# Patient Record
(demographics unavailable — no encounter records)

---

## 2025-02-08 NOTE — HISTORY OF PRESENT ILLNESS
[FreeTextEntry1] : Ms. SANGEETHA OGNZALEZ is a 56-year-old female who returns with regard to hx of type 2 dm. The diabetes has been present for about five years. She denies any history of retinopathy or nephropathy. With regard to neuropathy, she denies any neurol s/s.  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  For the diabetes, she is currently taking Mounjaro 10 mg weekly and has generally tolerated well.  Notes decrease in appetite.  - Was on Ozempic in the past, then switched to Mounjaro (supply shortage?).  Home glucose monitoring via Jad not available at this time as the device has been frequently falling off.   She does deny any significant hypoglycemic signs and symptoms of late.  Patient has seen weight loss on Ozempic/Mounjaro. She states she would not like to use any more weight; clothes not fitting.  Her current weight is ?? pounds, previously 157 pounds at the last visit in Aug 2024.   Recent A1C returned at 6.4% on labs 01/31/25. POCT glucose today at 101 mg/dL..  She denies polyuria, polydipsia, or any visual changes. She too denies any skin lesions, skin breakdown or non-healing areas of skin. She too denies any podiatric concerns. Ophthalmologic evaluation is up to date with Dr. Calles, no retinopathy noted.  ____________________________________________________________________________________________________ Patient does also have history of thyroid nodularity for which he is followed by Dr. Kearns. FNA March 2023 of dominant R nodule returned with results c/w with benign nodular goiter.   Latest Thyroid US from 08/2024 per Dr. Kearns showed a stable dominant right thyroid nodule.   Prior Thyroid US from 9/2023 per Dr. Kearns showed a stable dominant right thyroid nodule.   Denies anterior neck discomfort, difficulty swallowing, or any change in the appearance of the neck region|  Notes occasional nausea. too had leg cramps - Now on Magfnesium -better.   _____________________________________________________________________________________________++  Lab data from 01/31/2025 A1c 6.4 % LDL 58 Trig 51 TFT's qnl vitamin D 2-OH  25.3 ____________________________________________________________________________________________________  Additional Medications: Valsartan- HCTZ 320- 25 mg daily, Amlodipine 2.5 mg, Asa 81 mg, Atorvastatin 80 mg, Ezetimibe 10 mg Supplements: Vit D, CoQ-10

## 2025-02-08 NOTE — HISTORY OF PRESENT ILLNESS
[FreeTextEntry1] : Ms. SANGEETHA GONZALEZ is a 56-year-old female who returns with regard to hx of type 2 dm. The diabetes has been present for about five years. She denies any history of retinopathy or nephropathy. With regard to neuropathy, she denies any neurol s/s.  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  For the diabetes, she is currently taking Mounjaro 10 mg weekly and has generally tolerated well.  Notes decrease in appetite.  - Was on Ozempic in the past, then switched to Mounjaro (supply shortage?).  Home glucose monitoring via Jad not available at this time as the device has been frequently falling off.   She does deny any significant hypoglycemic signs and symptoms of late.  Patient has seen weight loss on Ozempic/Mounjaro. She states she would not like to use any more weight; clothes not fitting.  Her current weight is ?? pounds, previously 157 pounds at the last visit in Aug 2024.   Recent A1C returned at 6.4% on labs 01/31/25. POCT glucose today at 101 mg/dL..  She denies polyuria, polydipsia, or any visual changes. She too denies any skin lesions, skin breakdown or non-healing areas of skin. She too denies any podiatric concerns. Ophthalmologic evaluation is up to date with Dr. Calles, no retinopathy noted.  ____________________________________________________________________________________________________ Patient does also have history of thyroid nodularity for which he is followed by Dr. Kearns. FNA March 2023 of dominant R nodule returned with results c/w with benign nodular goiter.   Latest Thyroid US from 08/2024 per Dr. Kearns showed a stable dominant right thyroid nodule.   Prior Thyroid US from 9/2023 per Dr. Kearns showed a stable dominant right thyroid nodule.   Denies anterior neck discomfort, difficulty swallowing, or any change in the appearance of the neck region|  Notes occasional nausea. too had leg cramps - Now on Magfnesium -better.   _____________________________________________________________________________________________++  Lab data from 01/31/2025 A1c 6.4 % LDL 58 Trig 51 TFT's qnl vitamin D 2-OH  25.3 ____________________________________________________________________________________________________  Additional Medications: Valsartan- HCTZ 320- 25 mg daily, Amlodipine 2.5 mg, Asa 81 mg, Atorvastatin 80 mg, Ezetimibe 10 mg Supplements: Vit D, CoQ-10

## 2025-02-13 NOTE — HISTORY OF PRESENT ILLNESS
[FreeTextEntry1] : SANGEETHA is a 56 year F w/MHx of HLD, HTN, T2DM, Obesity, Thyroid nodule who presents for follow up. Last OV 8/6/2024. Denies chest pain, palpitations, diaphoresis, vision changes, HA, dizziness, syncope, cough, wheezing, SOB/FRANCO, edema, fatigue, fever, chills, infection/UTI SXS.

## 2025-06-30 NOTE — HISTORY OF PRESENT ILLNESS
[FreeTextEntry1] : Ms. SANGEETHA GONZALEZ is a 56-year-old female who returns with regard to hx of type 2 dm. The diabetes has been present for about five years. She denies any history of retinopathy or nephropathy. With regard to neuropathy, she denies any neurol s/s.  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 - Labs 01/31/25 showed Triglycerides at 51, HDL at 59 and LDL at 58. Takes Atorvastatin 80 mg daily and Ezetimibe 10 mg daily.  For the diabetes, she is currently taking Mounjaro 7.5 mg weekly and has generally tolerated it well.  Notes decrease in appetite.  - Was on Ozempic in the past, then switched to Mounjaro (supply shortage?).  Notes that her sensor often falls off. Home glucose monitoring via Jad 3+ has shown values of late to be averaging 101 with a variability of 23.5%. She does deny any significant hypoglycemic signs and symptoms of late. Sometimes gets low reading at night but is asymptomatic and does not check fingerstick.  Patient has seen weight loss on Ozempic/Mounjaro. She states she would not like to lose any more weight; clothes not fitting.  Her current weight is 150 lbs, previously 157 lbs in Aug 2024. Initial weight 168 lbs.  POCT A1C returned today at 6.3%, previously 6.2% on 02/01/25. POCT glucose today at 83 mg/dL.  She denies polyuria, polydipsia, or any visual changes. She too denies any skin lesions, skin breakdown or non-healing areas of skin. She too denies any podiatric concerns. Ophthalmologic evaluation is due at this time. She would like to switch from Dr. Calles. ____________________________________________________________________________________________________ Patient does also have history of thyroid nodularity for which she is followed by Dr. Kearns. FNA March 2023 of dominant R nodule returned with results c/w with benign nodular goiter.   Latest Thyroid US from 08/2024 per Dr. Kearns showed a stable dominant right thyroid nodule.   Prior Thyroid US from 9/2023 per Dr. Kearns showed a stable dominant right thyroid nodule.   Denies anterior neck discomfort, difficulty swallowing, or any change in the appearance of the neck region|  Notes occasional nausea. too had leg cramps - Now on Magnesium -better. ____________________________________________________________________________________________________  Additional Medications: Valsartan- HCTZ 320- 25 mg daily, Amlodipine 2.5 mg, Asa 81 mg, Atorvastatin 80 mg, Ezetimibe 10 mg Supplements: Vit D, CoQ-10

## 2025-06-30 NOTE — ADDENDUM
[FreeTextEntry1] : POCT glucose testing and Hemoglobin A1c was carried out today given diabetes diagnosis. Blood will be drawn in office today.  This note was written by Lilia Soriano on 06/30/2025 acting as medical scribe for Dr. Meng Esparza. I, Dr. Meng Esparza, have read and attest that all the information, medical decision making and discharge instructions within are true and accurate.